# Patient Record
Sex: MALE
[De-identification: names, ages, dates, MRNs, and addresses within clinical notes are randomized per-mention and may not be internally consistent; named-entity substitution may affect disease eponyms.]

---

## 2019-02-07 ENCOUNTER — HOSPITAL ENCOUNTER (OUTPATIENT)
Dept: HOSPITAL 80 - F3N | Age: 39
Setting detail: OBSERVATION
LOS: 1 days | Discharge: HOME | End: 2019-02-08
Payer: COMMERCIAL

## 2019-02-07 DIAGNOSIS — E66.9: ICD-10-CM

## 2019-02-07 DIAGNOSIS — J34.2: Primary | ICD-10-CM

## 2019-02-07 DIAGNOSIS — R03.0: ICD-10-CM

## 2019-02-07 DIAGNOSIS — Z87.891: ICD-10-CM

## 2019-02-07 DIAGNOSIS — J34.3: ICD-10-CM

## 2019-02-07 DIAGNOSIS — G47.33: ICD-10-CM

## 2019-02-07 PROCEDURE — 30520 REPAIR OF NASAL SEPTUM: CPT

## 2019-02-07 PROCEDURE — G0378 HOSPITAL OBSERVATION PER HR: HCPCS

## 2019-02-07 PROCEDURE — 30140 RESECT INFERIOR TURBINATE: CPT

## 2019-02-07 RX ADMIN — OXYCODONE HYDROCHLORIDE AND ACETAMINOPHEN PRN TAB: 5; 325 TABLET ORAL at 10:41

## 2019-02-07 RX ADMIN — Medication SCH MLS: at 13:11

## 2019-02-07 RX ADMIN — Medication SCH MLS: at 21:06

## 2019-02-07 RX ADMIN — OXYCODONE HYDROCHLORIDE AND ACETAMINOPHEN PRN TAB: 5; 325 TABLET ORAL at 12:33

## 2019-02-07 RX ADMIN — HYDROCHLOROTHIAZIDE SCH MG: 12.5 CAPSULE ORAL at 18:24

## 2019-02-07 RX ADMIN — OXYCODONE HYDROCHLORIDE AND ACETAMINOPHEN PRN TAB: 5; 325 TABLET ORAL at 15:48

## 2019-02-07 NOTE — HOSPPROG
Hospitalist Progress Note


Assessment/Plan: 





We have been consulted by ENT for mgmt of HTN. Please refer to our NP's 

consultation note. Briefly this is a 39 yo who had a Septoplasty today. He is 

noted to have HTN. He is obese. He has a hx of KENNY.





#HTN, unclear what his BP has been at home


-agree with BP mgmt, HCTZ per our NP


-would f/u with his PCP


-check for risk factors, DM, to determine need for ACE-I





#Obesity





#KENNY, CPAP





#s/p Septoplasty, mgmt per primary





Subjective: BP is elevated


Objective: 


 Vital Signs











Temp Pulse Resp BP Pulse Ox


 


 36.6 C   88   18   161/97 H  93 


 


 02/07/19 15:41  02/07/19 15:41  02/07/19 15:51  02/07/19 15:41  02/07/19 15:51








 











 02/06/19 02/07/19 02/08/19





 05:59 05:59 05:59


 


Intake Total   1000


 


Output Total   470


 


Balance   530














- Physical Exam


Constitutional: no apparent distress


Eyes: PERRL


Ears, Nose, Mouth, Throat: moist mucous membranes


Respiratory: no respiratory distress


Neurologic: AAOx3


Psychiatric: interacting appropriately, not anxious, not encephalopathic


Lymph, Heme, Immunologic: No petechiae





ICD10 Worksheet


Patient Problems: 


 Problems











Problem Status Onset


 


Nose septum deviation Acute  


 


Sleep apnea Acute

## 2019-02-07 NOTE — PDANEPAE
ANE Past Medical History





- Cardiovascular History


Hx Hypertension: No


Hx Arrhythmias: No


Hx Chest Pain: No


Hx Coronary Artery / Peripheral Vascular Disease: No


Hx CHF / Valvular Disease: No


Hx Palpitations: No





- Pulmonary History


Hx COPD: No


Hx Asthma/Reactive Airway Disease: No


Hx Recent Upper Respiratory Infection: No


Hx Oxygen in Use at Home: No


Hx Sleep Apnea: Yes


Sleep Apnea Screening Result - Last Documented: Positive


Pulmonary History Comment: KENNY USES C-PAP





- Neurologic History


Hx Cerebrovascular Accident: No


Hx Seizures: No


Hx Dementia: No





- Endocrine History


Hx Diabetes: No





- Renal History


Hx Renal Disorders: No





- Liver History


Hx Hepatic Disorders: No





- Neurological & Psychiatric Hx


Hx Neurological and Psychiatric Disorders: No





- Cancer History


Hx Cancer: No





- Congenital Disorder History


Hx Congenital Disorders: No





- GI History


Hx Gastrointestinal Disorders: No





- Other Health History


Other Health History: PREV NASAL FX X2.  MISSING TOOTH





- Chronic Pain History


Chronic Pain: No





- Surgical History


Prior Surgeries: LT INDEX FINGER.  RT KNEE ACL REPAIR.  T&T





ANE Review of Systems


Review of Systems: 








- Exercise capacity


METS (RN): 4 METS





ANE Patient History





- Allergies


Allergies/Adverse Reactions: 








No Known Allergies Allergy (Unverified 02/05/19 12:04)


 








- Home Medications


Home Medications: 








NK [No Known Home Meds]  02/05/19 [Last Taken Unknown]








- NPO status


NPO Since - Liquids (Date): 02/06/19


NPO Since - Liquids (Time): 19:30


NPO Since - Solids (Date): 02/06/19


NPO Since - Solids (Time): 19:30





- Smoking Hx


Smoking Status: Former smoker





ANE Labs/Vital Signs





- Vital Signs


Blood Pressure: 144/93


Heart Rate: 89


Respiratory Rate: 16


O2 Sat (%): 96


Height: 190.5 cm


Weight: 140.614 kg





ANE Physical Exam





- Airway


Neck exam: FROM, short neck


Mallampati Score: Class 2


Mouth exam: normal dental/mouth exam, abnormal chin





- Pulmonary


Pulmonary: no respiratory distress





- Cardiovascular


Cardiovascular: regular rate and rhythym





- ASA Status


ASA Status: II





ANE Anesthesia Plan


Anesthesia Plan: general endotracheal anesthesia


Specialized Airway: video laryngoscope

## 2019-02-07 NOTE — GOP
[f rep st]



                                                                OPERATIVE REPORT





DATE OF OPERATION:  02/07/2019



SURGEON:  Andrez Ramsey MD



ANESTHESIA:  General.



PREOPERATIVE DIAGNOSIS:  

1.  Nasal septal deviation.

2.  Inferior turbinate hypertrophy.

3.  Obstructive sleep apnea syndrome.



POSTOPERATIVE DIAGNOSIS:  

1.  Nasal septal deviation.

2.  Inferior turbinate hypertrophy.

3.  Obstructive sleep apnea syndrome.



PROCEDURE PERFORMED:  

1.  Nasal septoplasty.

2.  Bilateral submucous resection of inferior turbinates.



FINDINGS:  Severe rightward septal deviation with nearly 100% obstruction of the right side of the no
se.  There was external twisting of the nose as well, which was not addressed as this was not a autumn
tic procedure.



SPECIMENS:  None.



ESTIMATED BLOOD LOSS:  50 mL.



INDICATIONS:  The patient is a 38-year-old man with a lifelong history of nasal obstruction complaint
s.  He has tried and failed medical therapy.  He also has sleep apnea syndrome and the hope is that b
y improving the nasal airway, he will not only be more comfortable during the day, but will be able t
o tolerate his CPAP mask.



DESCRIPTION OF PROCEDURE:  Patient was taken to the OR, positively identified, placed on monitors, an
d general anesthesia was induced.  The nose was topically decongested with Afrin-soaked pledgets.  Th
e septum was infiltrated with 6 cc of 1% lidocaine with 1:100,000 epinephrine.  A right aisha transfix
ion incision was then made.  Dissection was carried along the subperichondrial and subperiosteal plan
e.  The bony cartilaginous junction was then bluntly divided, and dissection was carried along the le
ft side of the bony nasal septum.  The deviated bony portion of the nasal septum was then excised.  I
 then had to use a small osteotome, and removed a large spur coming off the maxillary crest and pushi
ng against the midportion of the inferior turbinate.  Once this was removed, I was then able to free 
up the anterior septum along the floor of the maxillary crest where it was still deviated off to the 
right side of the nose.  A thin strip, approximately 1 cm anterior-posterior x 5 mm superior-inferior
 was excised.  This allowed the septum to swing to the midline where it was secured with two 4-0 PDS 
sutures.  At this point, the nasal cavity was examined.  The septum was much closer to the midline wi
th significant improvement in the nasal airway.  At this point, the incision was closed with interrup
magnus 4-0 chromic sutures followed by 4-0 plain gut mattress stitch. 



The inferior turbinates were then infiltrated on either side with 3 cc of local anesthetic.  The subm
ucosal resection was then performed with a shaver.  The turbinates were then gently outfractured.  A 
Aponte __________ splint was placed on either side of the nose anteriorly and secured with a through-a
nd-through 3-0 nylon stitch.  At this point, 2 Afrin-soaked pledgets were placed on the right side of
 the nose and 1 on the left.  The strings were tied and taped to the cheek, and the case was terminat
ed.  __________ discontinued.  The patient tolerated the procedure well.



COMPLICATIONS:  None.





Job #:  [id number]

## 2019-02-07 NOTE — POSTOPPROG
Post Op Note


Date of Operation: 02/07/19


Surgeon: Andrez Ramsey


Anesthesiologist: Gautam James


Anesthesia: GET(General Endotracheal)


Pre-op Diagnosis: septal deviation, Turbinate hypertrophy


Post-op Diagnosis: same


Indication: nasal obstruction


Procedure: Septoplasty, bilateral SMRIT


Findings: severe right septal deviation and large turbinates


Inf/Abcess present in the surg proc area at time of surgery?: No


Depth: Deep Incisional (Fascial)


EBL: 


Complications: 





none





Specimen(s): 





none

## 2019-02-07 NOTE — SOAPPROG
SOAP Progress Note


Assessment/Plan: 


Assessment:


Pt with newly recognized HTN, with SDP in the 160's.


The hospitalist will be seeing him this evening to make a treatment plan for 

him going forward.























Plan:


Likely discharge in the am.


Follow up in one week for removal of nasal splints and again in three weeks.


02/07/19 17:47





Subjective: 





Pt has expected level of discomfort.  Some oozing of bleed, packs removed .


Objective: 





 Vital Signs











Temp Pulse Resp BP Pulse Ox


 


 36.6 C   88   18   161/97 H  93 


 


 02/07/19 15:41  02/07/19 15:41  02/07/19 15:51  02/07/19 15:41  02/07/19 15:51








 











 02/06/19 02/07/19 02/08/19





 05:59 05:59 05:59


 


Intake Total   1000


 


Output Total   470


 


Balance   530








Nasal pack removed.


No active bleeding





ICD10 Worksheet


Patient Problems: 


 Problems











Problem Status Onset


 


Nose septum deviation Acute  


 


Sleep apnea Acute  














- ICD10 Problem Qualifiers


(1) Sleep apnea








(2) Nose septum deviation

## 2019-02-07 NOTE — PDHOSCONS
History and Physical





- Chief Complaint


Nasal Septoplasty and BSMRIT





- History of Present Illness


ENT Dr. Kahn has asked hospital medicine to consult patient for 

hypertension and obesity.


This is a 37 y/o male with history of obstructive sleep apnea presenting for a 

nasal septoplasty and bilateral submucous resection of inferior turbinates (

BSMRIT) performed today.  He understands this most likely will not cure his 

sleep apnea, but instead allow him to tolerate the face mask more easily.





Past Medical History


1. Obesity


2. Hypertension


3. Right partial rotator cuff tear





Past Surgical History


1. ACL tendon repair


2. Finger tendon repair





Social


1. Works in IT


2. Denies tobacco or illicit drug use. Rarely drinks alcohol.





History Information





- Allergies/Home Medication List


Allergies/Adverse Reactions: 








No Known Allergies Allergy (Verified 02/07/19 11:51)


 





Home Medications: 








Herbals/Supplements -Info Only 1 ea PO DAILY 02/07/19 [Last Taken Unknown]





I have personally reviewed and updated: family history, medical history, social 

history, surgical history


Past Medical History: See HPI list





- Surgical History


Additional surgical history: See HPI list





- Family History


Positive for: non-pertinent





- Social History


Smoking Status: Former smoker


Alcohol Use: Rarely


Drug Use: None





Review of Systems


Review of Systems: 





ROS: 10pt was reviewed & negative except for what was stated in HPI & below


Constitutional: Reports: other (Weight gain)


EENMT: Reports: nose pain (Surgical)


Cardiac: Reports: no symptoms


Respiratory: Reports: no symptoms


Gastrointestinal: Reports: no symptoms


Genitourinary: Reports: no symptoms


Muscolosketal: Reports: no symptoms


Skin: Reports: no symptoms


Neurological: Reports: headache


Hematologic/Lymphatic: Reports: no symptoms


Immunologic/Allergy: Reports: no symptoms, other





Physical Exam


Physical Exam: 


Case discussed with admitting physician, Dr. Delfino Muhammad














Temp Pulse Resp BP Pulse Ox


 


 36.6 C   88   18   161/97 H  93 


 


 02/07/19 15:41  02/07/19 15:41  02/07/19 15:51  02/07/19 15:41  02/07/19 15:51




















O2 (L/minute)                  1














Constitutional: no apparent distress, appears nourished, uncomfortable (Rates 

surgical pain 4/10 after receiving percocet)


Eyes: PERRL, anicteric sclera, EOMI


Ears, Nose, Mouth, Throat: moist mucous membranes, hearing normal, ears appear 

normal, no oral mucosal ulcers, other (Surgical dressing over nose)


Cardiovascular: regular rate and rhythym, no murmur, rub, or gallop, No edema


Peripheral Pulses: 2+: dorsalis-pedis (R) (Radial 2+), dorsalis-pedis (L) (

Radial 2+)


Respiratory: no respiratory distress, no rales or rhonchi, clear to auscultation


Gastrointestinal: normoactive bowel sounds, soft, non-tender abdomen, no 

palpable masses


Genitourinary: no bladder fullness, no bladder tenderness


Skin: warm, normal color, no rashes or abrasions, no fluctuance, no induration, 

No mottled


Musculoskeletal: full muscle strength, no muscle tenderness, normal joint ROM, 

no joint effusions


Neurologic: AAOx3, sensation intact bilaterally, CN II-XII Intact


Psychiatric: interacting appropriately, not anxious, not encephalopathic, 

thought process linear


Lymph, Heme, Immunologic: no cervical LAD, no supraclavicular LAD





Assessment & Plan


Plan: 


37 y/o male POD #0 with nasal septoplasty and turbinate reduction surgery 

performed for the sake of the ease wearing the face mask d/t his severe 

obstructive sleep apnea.  





#Post-op care: pain management PO PRN, IV abx per surgeon's recommendation.  

Most likely discharge tomorrow.


#Obesity: The pt is currently exercising 4x/week and attempting weight loss - 

his target goal is 250 lbs.  Checking an A1c as he is at a higher risk of 

developing and/or having diabetes.


#Hypertension: Per pt, his normal BP is 120s/80s. Placed him on a low-dose HCTZ.





Diet: Regular


VTE ppx: SCDs


Code: Full


Dispo: Admit to obs

## 2019-02-08 VITALS — SYSTOLIC BLOOD PRESSURE: 125 MMHG | DIASTOLIC BLOOD PRESSURE: 76 MMHG

## 2019-02-08 RX ADMIN — Medication SCH MLS: at 05:19

## 2019-02-08 RX ADMIN — HYDROCHLOROTHIAZIDE SCH MG: 12.5 CAPSULE ORAL at 08:52

## 2019-02-08 NOTE — PDDCSUM
Discharge Summary


Discharge Summary: 





Pt POD#1 s/p NSR, SMRT by Dr. Ramsey


Doing well. Pain controlled


Hospitalist started pt on HCTZ for BP 


Okay for d/c


follow up next week in clinic

## 2019-02-08 NOTE — HOSPPROG
Hospitalist Progress Note


Assessment/Plan: 





We have been consulted by ENT for mgmt of HTN. 


37 yo who had a Septoplasty 


He is noted to have HTN. He is obese. He has a hx of KENNY.





#HTN, 


-resolved


-unclear what his BP has been at home


-agree with BP mgmt


-rec DC home with BP med


-would f/u with his PCP


-hA1c 5.4





#Obesity





#KENNY, CPAP





#s/p Septoplasty, mgmt per primary





#DC per ENT








Subjective: Feeling better. Some pain.


Objective: 


 Vital Signs











Temp Pulse Resp BP Pulse Ox


 


 36.4 C   91   16   125/76 H  93 


 


 02/08/19 07:11  02/08/19 07:11  02/08/19 07:11  02/08/19 08:52  02/08/19 07:11








 











 02/07/19 02/08/19 02/09/19





 05:59 05:59 05:59


 


Intake Total  3699 


 


Output Total  470 


 


Balance  3229 














- Physical Exam


Constitutional: appears nourished, obese, uncomfortable


Eyes: PERRL, anicteric sclera, EOMI


Ears, Nose, Mouth, Throat: moist mucous membranes, hearing normal, ears appear 

normal


Cardiovascular: No JVD, No tachycardia, No edema


Respiratory: no respiratory distress, no rales or rhonchi, reduced air movement


Gastrointestinal: normoactive bowel sounds, No tenderness, No ascites


Skin: warm, normal color, erythema


Musculoskeletal: normal joint ROM, no joint effusions, generalized weakness


Neurologic: AAOx3


Psychiatric: interacting appropriately, not anxious, not encephalopathic, 

thought process linear





ICD10 Worksheet


Patient Problems: 


 Problems











Problem Status Onset


 


Sleep apnea Acute  


 


Nose septum deviation Acute